# Patient Record
Sex: FEMALE
[De-identification: names, ages, dates, MRNs, and addresses within clinical notes are randomized per-mention and may not be internally consistent; named-entity substitution may affect disease eponyms.]

---

## 2021-06-11 ENCOUNTER — HOSPITAL ENCOUNTER (INPATIENT)
Dept: HOSPITAL 12 - ER | Age: 56
LOS: 10 days | Discharge: NURSING, REHAB OR LONG-TERM CARE | DRG: 885 | End: 2021-06-21
Payer: MEDICARE

## 2021-06-11 VITALS — SYSTOLIC BLOOD PRESSURE: 138 MMHG | DIASTOLIC BLOOD PRESSURE: 72 MMHG

## 2021-06-11 VITALS — BODY MASS INDEX: 26.66 KG/M2 | HEIGHT: 65 IN | WEIGHT: 160 LBS

## 2021-06-11 DIAGNOSIS — F25.0: Primary | ICD-10-CM

## 2021-06-11 DIAGNOSIS — R90.89: ICD-10-CM

## 2021-06-11 DIAGNOSIS — E55.9: ICD-10-CM

## 2021-06-11 DIAGNOSIS — Z20.822: ICD-10-CM

## 2021-06-11 DIAGNOSIS — E03.9: ICD-10-CM

## 2021-06-11 DIAGNOSIS — E66.9: ICD-10-CM

## 2021-06-11 DIAGNOSIS — E78.5: ICD-10-CM

## 2021-06-11 LAB
ALP SERPL-CCNC: 86 U/L (ref 50–136)
ALT SERPL W/O P-5'-P-CCNC: 20 U/L (ref 14–59)
AMPHETAMINES UR QL SCN>1000 NG/ML: NEGATIVE
APAP SERPL-MCNC: < 2 UG/ML (ref 10–30)
APPEARANCE UR: (no result)
AST SERPL-CCNC: 24 U/L (ref 15–37)
BILIRUB DIRECT SERPL-MCNC: 0.1 MG/DL (ref 0–0.2)
BILIRUB SERPL-MCNC: 0.3 MG/DL (ref 0.2–1)
BILIRUB UR QL STRIP: NEGATIVE
BUN SERPL-MCNC: 10 MG/DL (ref 7–18)
CHLORIDE SERPL-SCNC: 105 MMOL/L (ref 98–107)
CK SERPL-CCNC: 826 U/L (ref 26–192)
CO2 SERPL-SCNC: 25 MMOL/L (ref 21–32)
COCAINE UR QL SCN: NEGATIVE
COLOR UR: (no result)
CREAT SERPL-MCNC: 0.7 MG/DL (ref 0.6–1.3)
DEPRECATED SQUAMOUS URNS QL MICRO: (no result) /HPF
ETHANOL SERPL-MCNC: < 3 MG/DL (ref 0–0)
GLUCOSE SERPL-MCNC: 108 MG/DL (ref 74–106)
GLUCOSE UR STRIP-MCNC: NEGATIVE MG/DL
HCT VFR BLD AUTO: 41.5 % (ref 31.2–41.9)
HGB UR QL STRIP: NEGATIVE
KETONES UR STRIP-MCNC: NEGATIVE MG/DL
LEUKOCYTE ESTERASE UR QL STRIP: (no result)
MCH RBC QN AUTO: 30.5 UUG (ref 24.7–32.8)
MCV RBC AUTO: 91.6 FL (ref 75.5–95.3)
NITRITE UR QL STRIP: NEGATIVE
OPIATES UR QL SCN: NEGATIVE
PCP UR QL SCN>25 NG/ML: NEGATIVE
PH UR STRIP: 7.5 [PH] (ref 5–8)
PLATELET # BLD AUTO: 296 K/UL (ref 179–408)
POTASSIUM SERPL-SCNC: 3.9 MMOL/L (ref 3.5–5.1)
RBC #/AREA URNS HPF: (no result) /HPF (ref 0–3)
SP GR UR STRIP: 1.02 (ref 1–1.03)
THC UR QL SCN>50 NG/ML: NEGATIVE
TSH SERPL DL<=0.005 MIU/L-ACNC: 11.3 MIU/ML (ref 0.36–3.74)
UROBILINOGEN UR STRIP-MCNC: 0.2 E.U./DL
WBC #/AREA URNS HPF: (no result) /HPF
WBC #/AREA URNS HPF: (no result) /HPF (ref 0–3)
WS STN SPEC: 7.3 G/DL (ref 6.4–8.2)

## 2021-06-11 PROCEDURE — A4663 DIALYSIS BLOOD PRESSURE CUFF: HCPCS

## 2021-06-11 PROCEDURE — G0480 DRUG TEST DEF 1-7 CLASSES: HCPCS

## 2021-06-11 NOTE — NUR
1:1 sitter requested for patient d/t her intentions of wanting to harm herself 
earlier prior to arrival. Nursing supervisor notified, no sitters avaliable for 
patient at this time. Will continue to monitor patient for safety.

## 2021-06-11 NOTE — NUR
Pt received from ER at 2230 in a safe, stable condition. Alert and oriented x 4, patient 
denies pain and SI at this time; cooperative with admission procedure. Dr. Bill made 
aware of admission to [facility]. Pt oriented to the unit and her environment, physical assessment 
done--skin intact. Pt photo taken and placed in chart. Advisement given to pt. Safety 
precautions in place. Assisted with shower and kept comfortable. Will continue to monitor.

## 2021-06-11 NOTE — NUR
-------------------------------------------------------------------------------

          *** Note undone in ED - 06/11/21 at 2227 by YOLY ***          

-------------------------------------------------------------------------------

Pt. admitted to [address], under care of Dr. Bill/LIBRADO Villarreal List completed

## 2021-06-12 VITALS — DIASTOLIC BLOOD PRESSURE: 70 MMHG | SYSTOLIC BLOOD PRESSURE: 100 MMHG

## 2021-06-12 VITALS — DIASTOLIC BLOOD PRESSURE: 76 MMHG | SYSTOLIC BLOOD PRESSURE: 129 MMHG

## 2021-06-12 VITALS — DIASTOLIC BLOOD PRESSURE: 57 MMHG | SYSTOLIC BLOOD PRESSURE: 98 MMHG

## 2021-06-12 LAB
CHOLEST SERPL-MCNC: 221 MG/DL (ref ?–200)
HDLC SERPL-MCNC: 95 MG/DL (ref 40–60)
TRIGL SERPL-MCNC: 130 MG/DL (ref 30–150)

## 2021-06-12 RX ADMIN — LAMOTRIGINE SCH MG: 100 TABLET ORAL at 12:32

## 2021-06-12 RX ADMIN — LAMOTRIGINE SCH MG: 100 TABLET ORAL at 20:40

## 2021-06-12 RX ADMIN — TEMAZEPAM PRN MG: 7.5 CAPSULE ORAL at 23:41

## 2021-06-12 RX ADMIN — TEMAZEPAM PRN MG: 7.5 CAPSULE ORAL at 00:41

## 2021-06-12 RX ADMIN — LORAZEPAM PRN MG: 0.5 TABLET ORAL at 22:21

## 2021-06-13 VITALS — SYSTOLIC BLOOD PRESSURE: 132 MMHG | DIASTOLIC BLOOD PRESSURE: 76 MMHG

## 2021-06-13 VITALS — SYSTOLIC BLOOD PRESSURE: 128 MMHG | DIASTOLIC BLOOD PRESSURE: 77 MMHG

## 2021-06-13 VITALS — SYSTOLIC BLOOD PRESSURE: 99 MMHG | DIASTOLIC BLOOD PRESSURE: 52 MMHG

## 2021-06-13 RX ADMIN — LAMOTRIGINE SCH MG: 100 TABLET ORAL at 08:43

## 2021-06-13 RX ADMIN — BENZTROPINE MESYLATE SCH MG: 1 TABLET ORAL at 13:04

## 2021-06-13 RX ADMIN — LORAZEPAM PRN MG: 0.5 TABLET ORAL at 19:27

## 2021-06-13 RX ADMIN — BENZTROPINE MESYLATE SCH MG: 1 TABLET ORAL at 16:22

## 2021-06-13 RX ADMIN — LAMOTRIGINE SCH MG: 100 TABLET ORAL at 20:22

## 2021-06-14 VITALS — SYSTOLIC BLOOD PRESSURE: 94 MMHG | DIASTOLIC BLOOD PRESSURE: 66 MMHG

## 2021-06-14 VITALS — SYSTOLIC BLOOD PRESSURE: 154 MMHG | DIASTOLIC BLOOD PRESSURE: 70 MMHG

## 2021-06-14 VITALS — DIASTOLIC BLOOD PRESSURE: 64 MMHG | SYSTOLIC BLOOD PRESSURE: 101 MMHG

## 2021-06-14 LAB
ALP SERPL-CCNC: 93 U/L (ref 50–136)
ALT SERPL W/O P-5'-P-CCNC: 23 U/L (ref 14–59)
AST SERPL-CCNC: 21 U/L (ref 15–37)
BILIRUB SERPL-MCNC: 0.4 MG/DL (ref 0.2–1)
BUN SERPL-MCNC: 15 MG/DL (ref 7–18)
CHLORIDE SERPL-SCNC: 102 MMOL/L (ref 98–107)
CK SERPL-CCNC: 454 U/L (ref 26–192)
CO2 SERPL-SCNC: 28 MMOL/L (ref 21–32)
CREAT SERPL-MCNC: 0.9 MG/DL (ref 0.6–1.3)
GLUCOSE SERPL-MCNC: 120 MG/DL (ref 74–106)
HCT VFR BLD AUTO: 41.3 % (ref 31.2–41.9)
MAGNESIUM SERPL-MCNC: 2.1 MG/DL (ref 1.8–2.4)
MCH RBC QN AUTO: 30.8 UUG (ref 24.7–32.8)
MCV RBC AUTO: 90.9 FL (ref 75.5–95.3)
PLATELET # BLD AUTO: 324 K/UL (ref 179–408)
POTASSIUM SERPL-SCNC: 4 MMOL/L (ref 3.5–5.1)
WS STN SPEC: 7.6 G/DL (ref 6.4–8.2)

## 2021-06-14 RX ADMIN — ARIPIPRAZOLE SCH MG: 5 TABLET ORAL at 16:54

## 2021-06-14 RX ADMIN — LEVOTHYROXINE SODIUM SCH MCG: 25 TABLET ORAL at 06:50

## 2021-06-14 RX ADMIN — BENZTROPINE MESYLATE SCH MG: 1 TABLET ORAL at 16:54

## 2021-06-14 RX ADMIN — ACETAMINOPHEN PRN MG: 325 TABLET ORAL at 18:59

## 2021-06-14 RX ADMIN — BENZTROPINE MESYLATE SCH MG: 1 TABLET ORAL at 08:06

## 2021-06-14 RX ADMIN — BENZTROPINE MESYLATE SCH MG: 1 TABLET ORAL at 13:21

## 2021-06-14 RX ADMIN — TEMAZEPAM PRN MG: 7.5 CAPSULE ORAL at 22:23

## 2021-06-14 RX ADMIN — ARIPIPRAZOLE SCH MG: 5 TABLET ORAL at 13:21

## 2021-06-14 RX ADMIN — LAMOTRIGINE SCH MG: 100 TABLET ORAL at 20:12

## 2021-06-14 RX ADMIN — LORAZEPAM PRN MG: 0.5 TABLET ORAL at 23:19

## 2021-06-14 RX ADMIN — LAMOTRIGINE SCH MG: 100 TABLET ORAL at 08:06

## 2021-06-14 NOTE — NUR
SW Family Contact:



This SW contacted patient's brother Reece ([phone]) to discuss treatment and discharge 
plan, however, this SW was unable to leave a voicemail.

## 2021-06-14 NOTE — NUR
Board and Care:



This SW contacted patient's board and care and spoke with Cinthia ([phone]) who stated 
that patient is welcomed back upon discharge. She reported that patient's probate 
conservator is her brother Reece ([phone]). This SW stated that the probate 
conservatorship that was faxed to the hospital is . She reported that the brother 
renewed it but hasn't faxed it over to her yet. Cinthia stated that the brother is minimally 
involved in her care.

## 2021-06-14 NOTE — NUR
[facility] Public Guardian:



This SW spoke with  Arjun ([phone]) who stated that the pt does not have a 
probate conservatorship.

## 2021-06-14 NOTE — NUR
SW Initial Discharge Plan:



Patient currently resides at a Board and Care [address] [place] [address], [place] [address]. 
This SW contacted patient's [facility] and [facility] and spoke with Cinthia ([phone]) who stated 
that patient is welcomed back upon discharge. She reported that patient's probate 
conservator is her brother Reece ([phone]). This SW contacted patient's brother Reece 
([phone]) to discuss treatment and discharge plan, however, this SW was unable to leave 
a voicemail. This SW will work with the MD, treatment team, and family to help coordinate 
proper discharge.

## 2021-06-14 NOTE — NUR
Firearms Report:



 completed and submitted a [employer] firearms report for 5150 grave disability 
certification. A copy of report has been placed in patient chart.

## 2021-06-14 NOTE — NUR
Received Pt in her room lying in bed awake. Pt appeared preoccupied and hypervigilant. Pt 
expressed that she is anxious and experiencing racing thoughts. Ativan 0.5mg administered 
with minimal effect. Compliant with scheduled medications. Restoril 7.5mg administered at 
2341 for insomnia, which was effective. Denies SI/HI and verbally contracts for safety.

## 2021-06-15 VITALS — DIASTOLIC BLOOD PRESSURE: 66 MMHG | SYSTOLIC BLOOD PRESSURE: 103 MMHG

## 2021-06-15 VITALS — DIASTOLIC BLOOD PRESSURE: 65 MMHG | SYSTOLIC BLOOD PRESSURE: 104 MMHG

## 2021-06-15 VITALS — DIASTOLIC BLOOD PRESSURE: 64 MMHG | SYSTOLIC BLOOD PRESSURE: 91 MMHG

## 2021-06-15 RX ADMIN — ARIPIPRAZOLE SCH MG: 5 TABLET ORAL at 16:54

## 2021-06-15 RX ADMIN — BENZTROPINE MESYLATE SCH MG: 1 TABLET ORAL at 12:27

## 2021-06-15 RX ADMIN — BENZTROPINE MESYLATE SCH MG: 1 TABLET ORAL at 16:54

## 2021-06-15 RX ADMIN — Medication SCH MG: at 16:54

## 2021-06-15 RX ADMIN — LORAZEPAM PRN MG: 0.5 TABLET ORAL at 21:20

## 2021-06-15 RX ADMIN — ACETAMINOPHEN PRN MG: 325 TABLET ORAL at 16:54

## 2021-06-15 RX ADMIN — ARIPIPRAZOLE SCH MG: 5 TABLET ORAL at 08:34

## 2021-06-15 RX ADMIN — LAMOTRIGINE SCH MG: 100 TABLET ORAL at 08:34

## 2021-06-15 RX ADMIN — ARIPIPRAZOLE SCH MG: 5 TABLET ORAL at 12:27

## 2021-06-15 RX ADMIN — DOCUSATE SODIUM SCH MG: 100 CAPSULE, LIQUID FILLED ORAL at 20:02

## 2021-06-15 RX ADMIN — BENZTROPINE MESYLATE SCH MG: 1 TABLET ORAL at 08:34

## 2021-06-15 RX ADMIN — LEVOTHYROXINE SODIUM SCH MCG: 25 TABLET ORAL at 06:03

## 2021-06-15 RX ADMIN — LAMOTRIGINE SCH MG: 100 TABLET ORAL at 20:02

## 2021-06-15 NOTE — NUR
AISHA Individual Therapy: 



 met with patient for brief counseling to help address patient's presenting 
problem depressed mood. Patient appeared withdrawn and depressed. Patient did not want to 
conduct therapy at this time.

## 2021-06-15 NOTE — NUR
SW Family Contact:



This SW contacted patient's brother Reece ([phone]) to discuss treatment and discharge 
plan, however, this SW was unable to leave a voicemail.

## 2021-06-15 NOTE — NUR
GPS: Nursing Notes: Abdominal Auscultation:

Patient refusing for staff to examine her abdomen at this time, stated "No..Get away.. I am 
fine.. I have no pain... I did it yesterday...", patient is paranoid, and anxious affect, 
pacing in the hallway, Dr. Gallo informed by charge nurse, continue to monitor for 
safety, continue with treatment plan.

## 2021-06-16 VITALS — SYSTOLIC BLOOD PRESSURE: 110 MMHG | DIASTOLIC BLOOD PRESSURE: 68 MMHG

## 2021-06-16 VITALS — SYSTOLIC BLOOD PRESSURE: 102 MMHG | DIASTOLIC BLOOD PRESSURE: 60 MMHG

## 2021-06-16 VITALS — DIASTOLIC BLOOD PRESSURE: 69 MMHG | SYSTOLIC BLOOD PRESSURE: 114 MMHG

## 2021-06-16 RX ADMIN — LAMOTRIGINE SCH MG: 100 TABLET ORAL at 20:07

## 2021-06-16 RX ADMIN — Medication SCH MCG: at 06:12

## 2021-06-16 RX ADMIN — BENZTROPINE MESYLATE SCH MG: 1 TABLET ORAL at 12:16

## 2021-06-16 RX ADMIN — Medication SCH MG: at 16:30

## 2021-06-16 RX ADMIN — ARIPIPRAZOLE SCH MG: 10 TABLET ORAL at 16:31

## 2021-06-16 RX ADMIN — ARIPIPRAZOLE SCH MG: 10 TABLET ORAL at 12:16

## 2021-06-16 RX ADMIN — VITAMIN D, TAB 1000IU (100/BT) SCH UNIT: 25 TAB at 08:50

## 2021-06-16 RX ADMIN — TEMAZEPAM PRN MG: 7.5 CAPSULE ORAL at 22:58

## 2021-06-16 RX ADMIN — DOCUSATE SODIUM SCH MG: 100 CAPSULE, LIQUID FILLED ORAL at 20:07

## 2021-06-16 RX ADMIN — LORAZEPAM PRN MG: 0.5 TABLET ORAL at 23:55

## 2021-06-16 RX ADMIN — ARIPIPRAZOLE SCH MG: 10 TABLET ORAL at 08:59

## 2021-06-16 RX ADMIN — Medication SCH MG: at 08:52

## 2021-06-16 RX ADMIN — BENZTROPINE MESYLATE SCH MG: 1 TABLET ORAL at 08:51

## 2021-06-16 RX ADMIN — LAMOTRIGINE SCH MG: 100 TABLET ORAL at 08:51

## 2021-06-16 RX ADMIN — BENZTROPINE MESYLATE SCH MG: 1 TABLET ORAL at 16:30

## 2021-06-16 RX ADMIN — ACETAMINOPHEN PRN MG: 325 TABLET ORAL at 23:55

## 2021-06-16 NOTE — NUR
ASSISTED WITH SHOWERING TODAY HAD A FEW REQUESTS FOR JUICE AND AT TIMES SNACK BUT OTHER THAN 
THAT RARELY SOCIALISED OR PARTICIPATED IN ACTIVITIES WILL CONTINUE TO OBSERVE ENCOURAGE IN 
ACTIVITY PARTICIPATION AND VERBALISING NEEDS.

## 2021-06-16 NOTE — NUR
RECEIVED PATIENT IN BED AWAKE ALERT SEEMS DEPRESSED VERY QUIET BUT COOPERATIVE DUE 
MEDICATIONS GIVEN AND TOLERATED WELL ENCOURAGED TO GO TO THE D/ROOM AND PARTICIPATE IN 
ACTIVITIES VERY SHORT ATTENTION SPAN DID NOT ENGAGE IN CONVERSATION AT THIS TIME.

## 2021-06-17 VITALS — SYSTOLIC BLOOD PRESSURE: 117 MMHG | DIASTOLIC BLOOD PRESSURE: 79 MMHG

## 2021-06-17 VITALS — SYSTOLIC BLOOD PRESSURE: 91 MMHG | DIASTOLIC BLOOD PRESSURE: 63 MMHG

## 2021-06-17 VITALS — DIASTOLIC BLOOD PRESSURE: 60 MMHG | SYSTOLIC BLOOD PRESSURE: 102 MMHG

## 2021-06-17 RX ADMIN — VITAMIN D, TAB 1000IU (100/BT) SCH UNIT: 25 TAB at 08:47

## 2021-06-17 RX ADMIN — ARIPIPRAZOLE SCH MG: 10 TABLET ORAL at 17:21

## 2021-06-17 RX ADMIN — Medication SCH MG: at 08:46

## 2021-06-17 RX ADMIN — BENZTROPINE MESYLATE SCH MG: 1 TABLET ORAL at 17:21

## 2021-06-17 RX ADMIN — ATORVASTATIN CALCIUM SCH MG: 10 TABLET, FILM COATED ORAL at 20:39

## 2021-06-17 RX ADMIN — LAMOTRIGINE SCH MG: 100 TABLET ORAL at 08:47

## 2021-06-17 RX ADMIN — DOCUSATE SODIUM SCH MG: 100 CAPSULE, LIQUID FILLED ORAL at 20:39

## 2021-06-17 RX ADMIN — Medication SCH MCG: at 06:02

## 2021-06-17 RX ADMIN — TEMAZEPAM PRN MG: 7.5 CAPSULE ORAL at 21:31

## 2021-06-17 RX ADMIN — LAMOTRIGINE SCH MG: 100 TABLET ORAL at 20:39

## 2021-06-17 RX ADMIN — BENZTROPINE MESYLATE SCH MG: 1 TABLET ORAL at 12:47

## 2021-06-17 RX ADMIN — ARIPIPRAZOLE SCH MG: 10 TABLET ORAL at 08:47

## 2021-06-17 RX ADMIN — ARIPIPRAZOLE SCH MG: 10 TABLET ORAL at 12:46

## 2021-06-17 RX ADMIN — BENZTROPINE MESYLATE SCH MG: 1 TABLET ORAL at 08:47

## 2021-06-17 RX ADMIN — ACETAMINOPHEN PRN MG: 325 TABLET ORAL at 21:31

## 2021-06-17 RX ADMIN — Medication SCH MG: at 17:21

## 2021-06-17 NOTE — NUR
Gps/Lvn- Attended group therapy, quiet, guarded, not interactive, does not carry on 
conversations, responds to simple questions. Compliant with her routine medications.

## 2021-06-17 NOTE — NUR
Board and Care:



This SW contacted patient's board and care and spoke with Cinthia ([phone]) to coordinate 
discharge for pt on 6/21/21. Cinthia requested this SW to send patient's clinicals and this SW 
faxed it to ([phone]). Cinthia stated that she will provide Lyft transportation for pt 
on 

-------------------------------------------------------------------------------

Addendum: 06/17/21 at 1436 by AISHA CURIEL

-------------------------------------------------------------------------------

Cinthia stated that she will provide Lyft transportation for pt on 6/21 at 1PM.

## 2021-06-17 NOTE — NUR
SW Individual Therapy: 



 met with patient for brief counseling to help address patient's presenting 
problem depressed mood. Patient appeared withdrawn and depressed. Patient appeared paranoid 
and stating that "people are after her and no one likes me". Pt shared with this SW when she 
was a child she was bullied. SW actively listened and provided emotional support for pt.

## 2021-06-18 VITALS — DIASTOLIC BLOOD PRESSURE: 72 MMHG | SYSTOLIC BLOOD PRESSURE: 118 MMHG

## 2021-06-18 VITALS — DIASTOLIC BLOOD PRESSURE: 68 MMHG | SYSTOLIC BLOOD PRESSURE: 121 MMHG

## 2021-06-18 VITALS — DIASTOLIC BLOOD PRESSURE: 66 MMHG | SYSTOLIC BLOOD PRESSURE: 111 MMHG

## 2021-06-18 RX ADMIN — Medication SCH MCG: at 06:11

## 2021-06-18 RX ADMIN — ARIPIPRAZOLE SCH MG: 10 TABLET ORAL at 08:20

## 2021-06-18 RX ADMIN — BENZTROPINE MESYLATE SCH MG: 1 TABLET ORAL at 08:19

## 2021-06-18 RX ADMIN — VITAMIN D, TAB 1000IU (100/BT) SCH UNIT: 25 TAB at 08:19

## 2021-06-18 RX ADMIN — BENZTROPINE MESYLATE SCH MG: 1 TABLET ORAL at 12:52

## 2021-06-18 RX ADMIN — BENZTROPINE MESYLATE SCH MG: 1 TABLET ORAL at 16:16

## 2021-06-18 RX ADMIN — ARIPIPRAZOLE SCH MG: 10 TABLET ORAL at 12:52

## 2021-06-18 RX ADMIN — LAMOTRIGINE SCH MG: 100 TABLET ORAL at 20:42

## 2021-06-18 RX ADMIN — Medication SCH MG: at 08:20

## 2021-06-18 RX ADMIN — ATORVASTATIN CALCIUM SCH MG: 10 TABLET, FILM COATED ORAL at 20:42

## 2021-06-18 RX ADMIN — LAMOTRIGINE SCH MG: 100 TABLET ORAL at 08:19

## 2021-06-18 RX ADMIN — ARIPIPRAZOLE SCH MG: 10 TABLET ORAL at 16:16

## 2021-06-18 RX ADMIN — Medication SCH MG: at 16:16

## 2021-06-18 RX ADMIN — DOCUSATE SODIUM SCH MG: 100 CAPSULE, LIQUID FILLED ORAL at 20:42

## 2021-06-18 NOTE — NUR
GPS: REMAIN CALM AND COOPERATIVE.SELF CARE. COMPLIANT WITH MEDS. NO AGITATION NOTEDE. DENIES 
SI AT THIS TIME. SLEPT 5.15 HRS THROUGH THE NIGHT. CONTINUE PLAN OF CARE.

## 2021-06-19 VITALS — SYSTOLIC BLOOD PRESSURE: 120 MMHG | DIASTOLIC BLOOD PRESSURE: 56 MMHG

## 2021-06-19 VITALS — SYSTOLIC BLOOD PRESSURE: 111 MMHG | DIASTOLIC BLOOD PRESSURE: 65 MMHG

## 2021-06-19 VITALS — DIASTOLIC BLOOD PRESSURE: 53 MMHG | SYSTOLIC BLOOD PRESSURE: 99 MMHG

## 2021-06-19 RX ADMIN — LAMOTRIGINE SCH MG: 100 TABLET ORAL at 21:09

## 2021-06-19 RX ADMIN — ARIPIPRAZOLE SCH MG: 10 TABLET ORAL at 17:19

## 2021-06-19 RX ADMIN — BENZTROPINE MESYLATE SCH MG: 1 TABLET ORAL at 17:20

## 2021-06-19 RX ADMIN — ATORVASTATIN CALCIUM SCH MG: 10 TABLET, FILM COATED ORAL at 21:09

## 2021-06-19 RX ADMIN — LORAZEPAM PRN MG: 0.5 TABLET ORAL at 02:45

## 2021-06-19 RX ADMIN — Medication SCH MG: at 17:20

## 2021-06-19 RX ADMIN — BENZTROPINE MESYLATE SCH MG: 1 TABLET ORAL at 13:41

## 2021-06-19 RX ADMIN — BENZTROPINE MESYLATE SCH MG: 1 TABLET ORAL at 08:29

## 2021-06-19 RX ADMIN — DOCUSATE SODIUM SCH MG: 100 CAPSULE, LIQUID FILLED ORAL at 21:09

## 2021-06-19 RX ADMIN — ARIPIPRAZOLE SCH MG: 10 TABLET ORAL at 08:17

## 2021-06-19 RX ADMIN — VITAMIN D, TAB 1000IU (100/BT) SCH UNIT: 25 TAB at 08:17

## 2021-06-19 RX ADMIN — LAMOTRIGINE SCH MG: 100 TABLET ORAL at 08:17

## 2021-06-19 RX ADMIN — ARIPIPRAZOLE SCH MG: 10 TABLET ORAL at 13:41

## 2021-06-19 RX ADMIN — Medication SCH MG: at 08:17

## 2021-06-19 RX ADMIN — Medication SCH MCG: at 06:27

## 2021-06-19 NOTE — NUR
Received patient in the hallway. She is noted A/O x 3 able to verbalized feelings. appears 
depressed. mood is low, affect is blunted. Patient denies SI/HI/VH/AH she is able to 
verbally CFS. denial is convincing. patient is also reassured for her safety. patient also 
comply or itchiness in her back. Upon assessment, it was noted red hives on back and 
abdomen. pt is afebrile V/S stable at this time. patient was advised to stop scratching her 
back, lotion was applied to her back and abdomen with moderate relieve, Dr Nieves was 
notified and new order obtained to administer Benadryl 25mg PO PRN for Itchiness. Safety and 
fall precaution in place. will continue to monitor.

## 2021-06-19 NOTE — NUR
RECEIVED PATIENT IN BED AWAKE. NOTED QUITE,GUARDED WITH OCCASIONAL PACING UP AND DOWN THE 
HALLWAY AND GESTURING WITH HER HANDS. WHEN ASKED IF SHE HEARS VOICES OR  HAS VISUAL 
HALLUCINATIONS SHE DENIED THEM. SHE IS HOWEVER COMPLIANT WITH HER MEDS AND CARE. SAFETY 
PROTOCOLS IN PLACE. WILL CONTINUE TO MONITOR.

## 2021-06-20 VITALS — DIASTOLIC BLOOD PRESSURE: 60 MMHG | SYSTOLIC BLOOD PRESSURE: 106 MMHG

## 2021-06-20 VITALS — DIASTOLIC BLOOD PRESSURE: 60 MMHG | SYSTOLIC BLOOD PRESSURE: 107 MMHG

## 2021-06-20 VITALS — SYSTOLIC BLOOD PRESSURE: 102 MMHG | DIASTOLIC BLOOD PRESSURE: 53 MMHG

## 2021-06-20 RX ADMIN — ATORVASTATIN CALCIUM SCH MG: 10 TABLET, FILM COATED ORAL at 20:39

## 2021-06-20 RX ADMIN — BENZTROPINE MESYLATE SCH MG: 1 TABLET ORAL at 08:38

## 2021-06-20 RX ADMIN — ARIPIPRAZOLE SCH MG: 10 TABLET ORAL at 17:15

## 2021-06-20 RX ADMIN — Medication SCH MCG: at 06:11

## 2021-06-20 RX ADMIN — Medication SCH MG: at 17:15

## 2021-06-20 RX ADMIN — DOCUSATE SODIUM SCH MG: 100 CAPSULE, LIQUID FILLED ORAL at 20:39

## 2021-06-20 RX ADMIN — ARIPIPRAZOLE SCH MG: 10 TABLET ORAL at 08:39

## 2021-06-20 RX ADMIN — Medication SCH MG: at 08:38

## 2021-06-20 RX ADMIN — ARIPIPRAZOLE SCH MG: 10 TABLET ORAL at 12:39

## 2021-06-20 RX ADMIN — LAMOTRIGINE SCH MG: 100 TABLET ORAL at 08:38

## 2021-06-20 RX ADMIN — TEMAZEPAM PRN MG: 7.5 CAPSULE ORAL at 22:30

## 2021-06-20 RX ADMIN — TEMAZEPAM PRN MG: 7.5 CAPSULE ORAL at 01:17

## 2021-06-20 RX ADMIN — BENZTROPINE MESYLATE SCH MG: 1 TABLET ORAL at 12:40

## 2021-06-20 RX ADMIN — LAMOTRIGINE SCH MG: 100 TABLET ORAL at 20:39

## 2021-06-20 RX ADMIN — VITAMIN D, TAB 1000IU (100/BT) SCH UNIT: 25 TAB at 08:38

## 2021-06-20 RX ADMIN — BENZTROPINE MESYLATE SCH MG: 1 TABLET ORAL at 17:15

## 2021-06-21 VITALS — DIASTOLIC BLOOD PRESSURE: 57 MMHG | SYSTOLIC BLOOD PRESSURE: 104 MMHG

## 2021-06-21 RX ADMIN — VITAMIN D, TAB 1000IU (100/BT) SCH UNIT: 25 TAB at 08:17

## 2021-06-21 RX ADMIN — LAMOTRIGINE SCH MG: 100 TABLET ORAL at 08:17

## 2021-06-21 RX ADMIN — BENZTROPINE MESYLATE SCH MG: 1 TABLET ORAL at 08:18

## 2021-06-21 RX ADMIN — ARIPIPRAZOLE SCH MG: 10 TABLET ORAL at 08:17

## 2021-06-21 RX ADMIN — BENZTROPINE MESYLATE SCH MG: 1 TABLET ORAL at 12:40

## 2021-06-21 RX ADMIN — Medication SCH MG: at 08:17

## 2021-06-21 RX ADMIN — Medication SCH MCG: at 06:16

## 2021-06-21 RX ADMIN — ARIPIPRAZOLE SCH MG: 10 TABLET ORAL at 12:40

## 2021-06-21 NOTE — NUR
Discharge Note:



Patient currently resides at a [facility] [address] [facility], [place] [address]. 
Patients Board and Care Admin Cinthia ([phone]) set up Lyft transportation at 1PM. This 
SW contacted patient's board and care and spoke with Cinthia ([phone]) who stated that 
patient is welcomed back upon discharge. This SW left a voicemail to patients brother Reece 
Probate conservator ([phone]) of patients discharge. Patient is alert and oriented x3 
and is unable to plan for self-care. Patient denies any suicidal or homicidal ideations. 
Patient is aware and agreeable with discharge plans. Patient will continue to follow-up with 
(Psychiatrist) Dr. Jimenez and (Internist) Dr. Murphy at her [facility]. Patient 
presents with euthymic mood and congruent affect.

## 2021-06-21 NOTE — NUR
Patient will be discharged back to [facility] . Patients Board and Care Admin Cinthia 
will pick  up by Lyft transportation at 1PM. patients brother Reece Probate conservator aware 
of patients discharge. Patient is alert and oriented x3 and is unable to plan for 
self-care. all personal belonging returned to patient and signed.discharge instruction given 
to patient.